# Patient Record
Sex: FEMALE | Race: AMERICAN INDIAN OR ALASKA NATIVE
[De-identification: names, ages, dates, MRNs, and addresses within clinical notes are randomized per-mention and may not be internally consistent; named-entity substitution may affect disease eponyms.]

---

## 2018-08-13 ENCOUNTER — HOSPITAL ENCOUNTER (OUTPATIENT)
Dept: HOSPITAL 5 - PF | Age: 53
Discharge: HOME | End: 2018-08-13
Attending: INTERNAL MEDICINE
Payer: COMMERCIAL

## 2018-08-13 DIAGNOSIS — F17.210: ICD-10-CM

## 2018-08-13 DIAGNOSIS — J45.909: Primary | ICD-10-CM

## 2018-08-13 PROCEDURE — 94060 EVALUATION OF WHEEZING: CPT

## 2018-08-13 PROCEDURE — 94640 AIRWAY INHALATION TREATMENT: CPT

## 2023-05-01 ENCOUNTER — DASHBOARD ENCOUNTERS (OUTPATIENT)
Age: 58
End: 2023-05-01

## 2023-05-01 PROBLEM — 235595009: Status: ACTIVE | Noted: 2023-05-01

## 2023-05-02 ENCOUNTER — OFFICE VISIT (OUTPATIENT)
Dept: URBAN - METROPOLITAN AREA CLINIC 92 | Facility: CLINIC | Age: 58
End: 2023-05-02

## 2023-05-02 NOTE — HPI-TODAY'S VISIT:
This is a 57-year-old female referred for GI consultation of weight loss and a copy will be sent to the referring provider.  Upon review of the chart, she had an EGD and colonoscopy done as an inpatient by Dr. Josiah Duff on November 13, 2014.  The colonoscopy showed moderate diverticulosis in the sigmoid and descending colon otherwise normal.  The upper endoscopy showed a 4 cm hiatal hernia, grade C esophagitis and some Cb's erosions in the hernia sac.  Patient was treated with PPI.

## 2024-04-04 NOTE — PHYSICAL EXAM MUSCULOSKELETAL:
Airway    Performed by: George Guido CRNA  Authorized by: Obdulio Briones MD    Final Airway Type:  Supraglottic airway  Final Supraglottic Airway:  Classic  SGA Size*:  4  Attempts*:  1   Patient Identified, Procedure confirmed, Emergency equipment available and Safety protocols followed  Location:  OR  Urgency:  Elective  Difficult Airway: No    Indications for Airway Management:  Anesthesia  Mask Difficulty Assessment:  1 - vent by mask  Start Time: 4/4/2024 7:33 AM       normal gait and station , no tenderness or deformities present